# Patient Record
Sex: FEMALE | URBAN - METROPOLITAN AREA
[De-identification: names, ages, dates, MRNs, and addresses within clinical notes are randomized per-mention and may not be internally consistent; named-entity substitution may affect disease eponyms.]

---
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
## 2021-06-11 NOTE — PROGRESS NOTES
S: DIANE Preston , calling with clinical for pt in [facility]. #[phone]     B: Per caller: 19 YO female, is pregnant with her 2nd child. During first pregnancy she was ok; has depression this pregnancy. One month ago she took 10 Unisom pills as an overdose attempt; didn't tell anyone about it. This morning she states she took 20-30 Unisom with the intent to kill herself. ED MD questions pt's report due to her lack of sx. Pt seems disengaged, flat, apathetic, odd affect. No reported drug or alcohol use. Pt is asymptomatic for COVID; swab was requested. Unknown if collected. Pt doesn't have any OP services. Pt is unable to contract for safety outside of the hospital. No other chronic medical. Pt was alert during assessment.     Per fax from [facility] # [phone]:pt is approximately 26 weeks pregnant; reports trouble with depression since becoming pregnant. /67, Temp 99.7, Pulse 114. Hx kidney stones. Poison control contacted who recommended EKG and monitoring on tele for 4 hours. Tylenol to be checked 1300 today. Labs resulted: low potassium 3.3. UDS negative.    Called ED at 1518: medically cleared. EKG normal. Poison Control signed off. Voluntary and willing to admit to [facility]. SARS negative (asked this be faxed to me); wondering if Potassium is being replaced. Awaiting call back.    Called [facility] to ask for copy of [facility] assessment be faxed to intake asap.    A: Voluntary and cooperative; pt is willing to come to [facility] for MH admission.     R: Pt placed on work list for available adult MH bed at [facility]; intake awaiting fax from [facility]  as well as full clinical from [facility]. Caller will request this be faxed to us and will also confirm that pt is medically cleared. Intake awaiting update.    Note: further documentation will be in [facility] epic.